# Patient Record
Sex: MALE
[De-identification: names, ages, dates, MRNs, and addresses within clinical notes are randomized per-mention and may not be internally consistent; named-entity substitution may affect disease eponyms.]

---

## 2018-01-05 ENCOUNTER — HOSPITAL ENCOUNTER (EMERGENCY)
Dept: HOSPITAL 14 - H.ER | Age: 38
LOS: 1 days | Discharge: LEFT BEFORE DISCHARGE | End: 2018-01-06
Payer: COMMERCIAL

## 2018-01-05 VITALS — TEMPERATURE: 98 F | OXYGEN SATURATION: 100 %

## 2018-01-05 VITALS — BODY MASS INDEX: 36.9 KG/M2

## 2018-01-05 DIAGNOSIS — F41.9: ICD-10-CM

## 2018-01-05 DIAGNOSIS — E78.00: ICD-10-CM

## 2018-01-05 DIAGNOSIS — E11.9: ICD-10-CM

## 2018-01-05 DIAGNOSIS — Z79.82: ICD-10-CM

## 2018-01-05 DIAGNOSIS — I10: ICD-10-CM

## 2018-01-05 DIAGNOSIS — R07.89: Primary | ICD-10-CM

## 2018-01-05 LAB
ALBUMIN SERPL-MCNC: 4.3 G/DL (ref 3.5–5)
ALBUMIN/GLOB SERPL: 1.5 {RATIO} (ref 1–2.1)
ALT SERPL-CCNC: 57 U/L (ref 21–72)
APTT BLD: 30.2 SECONDS (ref 25.6–37.1)
AST SERPL-CCNC: 25 U/L (ref 17–59)
BASOPHILS # BLD AUTO: 0.1 K/UL (ref 0–0.2)
BASOPHILS NFR BLD: 0.6 % (ref 0–2)
BUN SERPL-MCNC: 12 MG/DL (ref 9–20)
CALCIUM SERPL-MCNC: 9.3 MG/DL (ref 8.4–10.2)
EOSINOPHIL # BLD AUTO: 0.2 K/UL (ref 0–0.7)
EOSINOPHIL NFR BLD: 2.1 % (ref 0–4)
ERYTHROCYTE [DISTWIDTH] IN BLOOD BY AUTOMATED COUNT: 13.6 % (ref 11.5–14.5)
GFR NON-AFRICAN AMERICAN: > 60
HGB BLD-MCNC: 14.5 G/DL (ref 12–18)
INR PPP: 1.1 (ref 0.9–1.2)
LYMPHOCYTES # BLD AUTO: 2.2 K/UL (ref 1–4.3)
LYMPHOCYTES NFR BLD AUTO: 22.2 % (ref 20–40)
MCH RBC QN AUTO: 28.9 PG (ref 27–31)
MCHC RBC AUTO-ENTMCNC: 33.6 G/DL (ref 33–37)
MCV RBC AUTO: 86 FL (ref 80–94)
MONOCYTES # BLD: 0.8 K/UL (ref 0–0.8)
MONOCYTES NFR BLD: 7.9 % (ref 0–10)
NEUTROPHILS # BLD: 6.7 K/UL (ref 1.8–7)
NEUTROPHILS NFR BLD AUTO: 67.2 % (ref 50–75)
NRBC BLD AUTO-RTO: 0.2 % (ref 0–0)
PLATELET # BLD: 245 K/UL (ref 130–400)
PMV BLD AUTO: 8.6 FL (ref 7.2–11.7)
PROTHROMBIN TIME: 12 SECONDS (ref 9.8–13.1)
RBC # BLD AUTO: 5.02 MIL/UL (ref 4.4–5.9)
WBC # BLD AUTO: 9.9 K/UL (ref 4.8–10.8)

## 2018-01-05 PROCEDURE — 80053 COMPREHEN METABOLIC PANEL: CPT

## 2018-01-05 PROCEDURE — 93005 ELECTROCARDIOGRAM TRACING: CPT

## 2018-01-05 PROCEDURE — 85025 COMPLETE CBC W/AUTO DIFF WBC: CPT

## 2018-01-05 PROCEDURE — 85730 THROMBOPLASTIN TIME PARTIAL: CPT

## 2018-01-05 PROCEDURE — 99285 EMERGENCY DEPT VISIT HI MDM: CPT

## 2018-01-05 PROCEDURE — 84484 ASSAY OF TROPONIN QUANT: CPT

## 2018-01-05 PROCEDURE — 82948 REAGENT STRIP/BLOOD GLUCOSE: CPT

## 2018-01-05 PROCEDURE — 85610 PROTHROMBIN TIME: CPT

## 2018-01-05 NOTE — ED PDOC
HPI: Chest Pain


Time Seen by Provider: 01/05/18 22:23


Chief Complaint (Nursing): Chest Pain


Chief Complaint (Provider): Chest Pain


History Per: Patient


History/Exam Limitations: no limitations


Onset/Duration Of Symptoms: Hrs (x1 prior to arrival)


Current Symptoms Are (Timing): Still Present


Additional Complaint(s): 





37 year old male with previous medial history of hypertension, diabetes and 

severe anxiety, who presents to the emergency department with a complaint of 

left-sided chest pain radiating from left arm discomfort associated with left-

sided facial and neck tightness while sitting at home an hour prior to arrival 

after consuming dinner. Denied any nausea, vomiting or shortness of breath. 

Patient stated his pain was 10/10 at onset of symptoms but subsided to 6/10 

upon arrival to ED after taking 3 baby aspirin for relief. Of note, patient was 

recently evaluated by his cardiologist with a positive stress test and will be 

set up for a cardiac catheterization soon. 





Cardiologist: Jackelyn Lara MD





Past Medical History


Reviewed: Historical Data, Nursing Documentation, Vital Signs


Vital Signs: 


 Last Vital Signs











Temp  98.0 F   01/05/18 22:19


 


Pulse  68   01/05/18 23:03


 


Resp  16   01/05/18 22:19


 


BP  139/80   01/05/18 23:03


 


Pulse Ox  100   01/05/18 23:52














- Medical History


PMH: Anxiety, Diabetes, HTN, Hypercholesterolemia


   Denies: HIV, Chronic Kidney Disease





- Surgical History


Surgical History: No Surg Hx





- Family History


Family History: States: Unknown Family Hx, Diabetes, Hypertension





- Social History


Current smoker - smoking cessation education provided: No


Alcohol: Occasional


Drugs: Denies





- Immunization History


Hx Tetanus Toxoid Vaccination: No


Hx Influenza Vaccination: No


Hx Pneumococcal Vaccination: No





- Home Medications


Home Medications: 


 Ambulatory Orders











 Medication  Instructions  Recorded


 


ALPRAZolam [Xanax] 2 mg PO DAILY 09/26/17


 


Sitagliptin Phos/Metformin HCl 1 tab PO DAILY 09/26/17





[Janumet 50-1,000 mg Tablet]  


 


Valsartan/Hydrochlorothiazide 1 tab PO DAILY 09/26/17





[Valsartan-Hctz 320-25 mg Tab]  


 


Atorvastatin [Lipitor] 10 mg PO DAILY #30 tab 09/27/17


 


Aspirin [Ecotrin] 81 mg PO DAILY PRN 01/05/18


 


Metoprolol Succinate [Toprol Xl] 50 mg PO DAILY 01/05/18














- Allergies


Allergies/Adverse Reactions: 


 Allergies











Allergy/AdvReac Type Severity Reaction Status Date / Time


 


No Known Allergies Allergy   Verified 07/12/17 08:39














Review of Systems


ROS Statement: Except As Marked, All Systems Reviewed And Found Negative


Cardiovascular: Positive for: Chest Pain (left-sided tightness)


Respiratory: Negative for: Shortness of Breath


Gastrointestinal: Negative for: Nausea, Vomiting


Musculoskeletal: Positive for: Neck Pain (left-sided), Arm Pain (left-sided 

discomfort)


Neurological: Positive for: Other (left-sided facial tightness)


Psych: Positive for: Anxiety





Physical Exam





- Reviewed


Nursing Documentation Reviewed: Yes


Vital Signs Reviewed: Yes





- Physical Exam


Appears: Positive for: Well, Non-toxic, No Acute Distress


Skin: Positive for: Normal Color


Eye Exam: Positive for: Normal appearance


ENT: Positive for: Normal ENT Inspection


Neck: Positive for: Normal, Painless ROM, Supple.  Negative for: Decreased ROM


Cardiovascular/Chest: Positive for: Regular Rate, Rhythm.  Negative for: Chest 

Non Tender, Bradycardia, Tachycardia


Respiratory: Positive for: Normal Breath Sounds, Decreased Breath Sounds.  

Negative for: Wheezing, Respiratory Distress


Gastrointestinal/Abdominal: Positive for: Normal Exam, Soft.  Negative for: 

Tenderness


Extremity: Positive for: Normal ROM.  Negative for: Pedal Edema, Calf Tenderness


Neurologic/Psych: Positive for: Alert (x3), Oriented.  Negative for: Motor/

Sensory Deficits





- Laboratory Results


Result Diagrams: 


 01/05/18 23:00





 01/05/18 23:00





- ECG


O2 Sat by Pulse Oximetry: 100 (RA)


Pulse Ox Interpretation: Normal





Medical Decision Making


Medical Decision Making: 





Initial Impression:





Initial Plan:   


* EKG


* CMP


* Troponin I


* CBC


* PTT


* PT


* CXR


* Aspirin 81mg PO


* Lopressor 25mg PO


* NS 1,000ml IV per 125mls/hr


* Nitro-bid 2% oint 0.5


________________________________________________________________________________

_____





Time: 2312


--EKG: NSR at 68 BMP. No acute changes.





--------------------------------------------------------------------------------

------------------


Scribe Attestation:


Documented by Cece Khan, acting as a scribe for Chelsie Williamson MD.





Provider Scribe Attestation:


All medical record entries made by the Scribe were at my direction and 

personally dictated by me. I have reviewed the chart and agree that the record 

accurately reflects my personal performance of the history, physical exam, 

medical decision making, and the department course for this patient. I have 

also personally directed, reviewed, and agree with the discharge instructions 

and disposition.





11;45p - patient decides that he is feeling better and does not want admission. 

Reviewed the pertinet history with him and that he is at higher risk for acute 

coronary syndrome. This was explained to him in plain English. He understands 

the consequences and understands that he may return to our ER at any point and 

will receive the same care that he has thus far.





Disposition





- Clinical Impression


Clinical Impression: 


 Acute chest pain








- Disposition


Disposition: Against Medical Advice


Disposition Time: 00:00


Condition: GUARDED


Forms:  [facility] (English)





- POA


Present On Arrival: None

## 2018-01-06 VITALS — SYSTOLIC BLOOD PRESSURE: 138 MMHG | RESPIRATION RATE: 17 BRPM | DIASTOLIC BLOOD PRESSURE: 76 MMHG | HEART RATE: 81 BPM

## 2018-01-06 NOTE — CARD
--------------- APPROVED REPORT --------------





EKG Measurement

Heart [id number]

NJ [id number]

[id number]

[id number]

[id number]



<Conclusion>

Normal sinus rhythm

Minimal voltage criteria for LVH, may be normal variant

ST elevation, consider early repolarization, pericarditis, or injury

Abnormal ECG

## 2018-09-14 ENCOUNTER — HOSPITAL ENCOUNTER (OUTPATIENT)
Dept: HOSPITAL 14 - H.ER | Age: 38
Setting detail: OBSERVATION
LOS: 1 days | Discharge: HOME | End: 2018-09-15
Attending: INTERNAL MEDICINE | Admitting: INTERNAL MEDICINE
Payer: COMMERCIAL

## 2018-09-14 VITALS — BODY MASS INDEX: 36.9 KG/M2

## 2018-09-14 DIAGNOSIS — E66.01: ICD-10-CM

## 2018-09-14 DIAGNOSIS — R20.0: Primary | ICD-10-CM

## 2018-09-14 DIAGNOSIS — E11.65: ICD-10-CM

## 2018-09-14 DIAGNOSIS — R20.2: ICD-10-CM

## 2018-09-14 DIAGNOSIS — F41.9: ICD-10-CM

## 2018-09-14 DIAGNOSIS — E78.1: ICD-10-CM

## 2018-09-14 DIAGNOSIS — I10: ICD-10-CM

## 2018-09-14 DIAGNOSIS — E78.00: ICD-10-CM

## 2018-09-14 LAB
ALBUMIN SERPL-MCNC: 4.3 G/DL (ref 3.5–5)
ALBUMIN/GLOB SERPL: 1.4 {RATIO} (ref 1–2.1)
ALT SERPL-CCNC: 64 U/L (ref 21–72)
APTT BLD: 32.3 SECONDS (ref 25.6–37.1)
AST SERPL-CCNC: 35 U/L (ref 17–59)
BASOPHILS # BLD AUTO: 0.1 K/UL (ref 0–0.2)
BASOPHILS NFR BLD: 0.7 % (ref 0–2)
BUN SERPL-MCNC: 12 MG/DL (ref 9–20)
CALCIUM SERPL-MCNC: 9.4 MG/DL (ref 8.4–10.2)
EOSINOPHIL # BLD AUTO: 0.4 K/UL (ref 0–0.7)
EOSINOPHIL NFR BLD: 3.4 % (ref 0–4)
ERYTHROCYTE [DISTWIDTH] IN BLOOD BY AUTOMATED COUNT: 13.3 % (ref 11.5–14.5)
GFR NON-AFRICAN AMERICAN: > 60
HDLC SERPL-MCNC: 30 MG/DL (ref 30–70)
HGB BLD-MCNC: 15 G/DL (ref 12–18)
INR PPP: 1
LDLC SERPL-MCNC: 73 MG/DL (ref 0–129)
LYMPHOCYTES # BLD AUTO: 2.9 K/UL (ref 1–4.3)
LYMPHOCYTES NFR BLD AUTO: 25.6 % (ref 20–40)
MCH RBC QN AUTO: 29 PG (ref 27–31)
MCHC RBC AUTO-ENTMCNC: 33.5 G/DL (ref 33–37)
MCV RBC AUTO: 86.4 FL (ref 80–94)
MONOCYTES # BLD: 0.9 K/UL (ref 0–0.8)
MONOCYTES NFR BLD: 7.6 % (ref 0–10)
NEUTROPHILS # BLD: 7.1 K/UL (ref 1.8–7)
NEUTROPHILS NFR BLD AUTO: 62.7 % (ref 50–75)
NRBC BLD AUTO-RTO: 0.1 % (ref 0–0)
PLATELET # BLD: 282 K/UL (ref 130–400)
PMV BLD AUTO: 8.6 FL (ref 7.2–11.7)
PROTHROMBIN TIME: 11.6 SECONDS (ref 9.8–13.1)
RBC # BLD AUTO: 5.16 MIL/UL (ref 4.4–5.9)
WBC # BLD AUTO: 11.4 K/UL (ref 4.8–10.8)

## 2018-09-14 PROCEDURE — 70450 CT HEAD/BRAIN W/O DYE: CPT

## 2018-09-14 PROCEDURE — 84484 ASSAY OF TROPONIN QUANT: CPT

## 2018-09-14 PROCEDURE — 85025 COMPLETE CBC W/AUTO DIFF WBC: CPT

## 2018-09-14 PROCEDURE — 70498 CT ANGIOGRAPHY NECK: CPT

## 2018-09-14 PROCEDURE — 96361 HYDRATE IV INFUSION ADD-ON: CPT

## 2018-09-14 PROCEDURE — 85610 PROTHROMBIN TIME: CPT

## 2018-09-14 PROCEDURE — 70496 CT ANGIOGRAPHY HEAD: CPT

## 2018-09-14 PROCEDURE — 83036 HEMOGLOBIN GLYCOSYLATED A1C: CPT

## 2018-09-14 PROCEDURE — 84443 ASSAY THYROID STIM HORMONE: CPT

## 2018-09-14 PROCEDURE — 86900 BLOOD TYPING SEROLOGIC ABO: CPT

## 2018-09-14 PROCEDURE — 82948 REAGENT STRIP/BLOOD GLUCOSE: CPT

## 2018-09-14 PROCEDURE — 86850 RBC ANTIBODY SCREEN: CPT

## 2018-09-14 PROCEDURE — 85730 THROMBOPLASTIN TIME PARTIAL: CPT

## 2018-09-14 PROCEDURE — 82607 VITAMIN B-12: CPT

## 2018-09-14 PROCEDURE — 93005 ELECTROCARDIOGRAM TRACING: CPT

## 2018-09-14 PROCEDURE — 71045 X-RAY EXAM CHEST 1 VIEW: CPT

## 2018-09-14 PROCEDURE — 80061 LIPID PANEL: CPT

## 2018-09-14 PROCEDURE — 80053 COMPREHEN METABOLIC PANEL: CPT

## 2018-09-14 PROCEDURE — 36415 COLL VENOUS BLD VENIPUNCTURE: CPT

## 2018-09-14 PROCEDURE — 96360 HYDRATION IV INFUSION INIT: CPT

## 2018-09-14 PROCEDURE — 99284 EMERGENCY DEPT VISIT MOD MDM: CPT

## 2018-09-14 NOTE — ED PDOC
HPI:STROKE





- Time


Time: 23:09





- Historian


Historian: Patient,  (In Demand)





- Onset


Date: 18


Time: 18:30





- Timing


Timing: Improved





- Context


Context: Other





- Location


Locate left: Face





- Severity of pain


Maximum severity:: Mild





- TPA


Positive for Contraindication: No


Reason tPA is not being Administered: Rapidly improving symptoms





- Notes:


Notes:: 





Hx of DM, HTN, Anxiety presenting with L sided weakness, numbness, pain.  

States that around 630PM he was at work and became upset, started feeling 

dizzy.  States that he felt hungry and went to eat and half-way through his 

meal he felt worsening dizziness and the L side of his body became numb, 

concentrated mostly in the face and L upper extremity.  He went home and felt 

like he was unable to move his lips and had a "bad pain" in the center of his 

head.  He states the total duration of the weakness of the L arm was 1.5 hours.

  States when he was walking he felt total body weakness and felt like he was 

going to collapse.  States he also has had chest pain on and off for one week.  

On arrival to the ED he states his weakness and numbness have both improved but 

still having dizziness and pain in his left arm.  He states his face feels 

"shaky".





NIHSS Stroke Scale





- Date/Time Evaluation Performed


Date Performed: 18


Time Performed: 22:30


When Was NIHSS Performed: Baseline





- How Severe is the Stroke


Level of Consciousness: 0=Alert


LOC to Questions: 0=Both comments correct


LOC to commands: 0=Obeys both correctly


Best Gaze: 0=Normal


Visual: 0=No visual loss


Facial: 0=Normal


Motor Arm - Left: 0=No drift


Motor Arm - Right: 0=No drift


Motor Leg - Left: 0=No drift


Motor Leg - Right: 0=No drift


Limb Ataxia: 0=Absent


Sensory: 0=Normal


Best Language: 0=No aphasia


Dysarthia: 0=Normal articulation


Extinction & Inattention (Neglect): 0=Normal, no object


Score: 0


Severity Of Stroke: 0 = No Stroke





rTPA Inclusion/Exclusion





- Refusal of Treatment


Patient Refused Treatment: No





- Inclusion Criteria for Altepase


Patient is 18 years or Older: Yes


The Clinical Diagnosis of Ischemic Stroke That is Causing a Potentially 

Disabling Neurological Deficit: No


Time of Onset is Well Established to be Less Than 270 Minute Before Treatment 

Would Begin: Yes


Risk/Benefit Discussed With Patient/Family Member Present: Yes





Past Medical History


Reviewed: Historical Data, Nursing Documentation, Vital Signs


Vital Signs: 





 Last Vital Signs











Temp  98.3 F   18 22:19


 


Pulse  73   18 22:51


 


Resp  17   18 22:51


 


BP  126/73   18 22:51


 


Pulse Ox  96   18 22:19














- Medical History


PMH: Anxiety, Diabetes, HTN, Hypercholesterolemia


   Denies: HIV, Chronic Kidney Disease





- Family History


Family History: States: Unknown Family Hx, Diabetes, Hypertension





- Immunization History


Hx Tetanus Toxoid Vaccination: No


Hx Influenza Vaccination: No


Hx Pneumococcal Vaccination: No





- Home Medications


Home Medications: 


 Ambulatory Orders











 Medication  Instructions  Recorded


 


ALPRAZolam [Xanax] 2 mg PO DAILY 17


 


Sitagliptin Phos/Metformin HCl 1 tab PO DAILY 17





[Janumet 50-1,000 mg Tablet]  


 


Valsartan/Hydrochlorothiazide 1 tab PO DAILY 17





[Valsartan-Hctz 320-25 mg Tab]  


 


Atorvastatin [Lipitor] 10 mg PO DAILY #30 tab 17


 


Aspirin [Ecotrin] 81 mg PO DAILY PRN 18


 


Metoprolol Succinate [Toprol Xl] 50 mg PO DAILY 18














- Allergies


Allergies/Adverse Reactions: 


 Allergies











Allergy/AdvReac Type Severity Reaction Status Date / Time


 


No Known Allergies Allergy   Verified 18 22:19














Review of Systems


ROS Statement: Except As Marked, All Systems Reviewed And Found Negative


Cardiovascular: Positive for: Chest Pain


Musculoskeletal: Positive for: Arm Pain


Neurological: Positive for: Weakness, Numbness, Headache





Physical Exam





- Reviewed


Nursing Documentation Reviewed: Yes


Vital Signs Reviewed: Yes





- Physical Exam


Appears: Positive for: Well, Non-toxic, No Acute Distress


Head Exam: Positive for: ATRAUMATIC, NORMAL INSPECTION, NORMOCEPHALIC


Skin: Positive for: Normal Color, Warm, DRY


Eye Exam: Positive for: EOMI, Normal appearance, PERRL


ENT: Positive for: Normal ENT Inspection


Neck: Positive for: Normal, Painless ROM


Cardiovascular/Chest: Positive for: Regular Rate, Rhythm


Respiratory: Positive for: CNT, Normal Breath Sounds


Gastrointestinal/Abdominal: Positive for: Normal Exam, Soft.  Negative for: 

Tenderness


Back: Positive for: Normal Inspection


Extremity: Positive for: Normal ROM


Neurologic/Psych: Positive for: Alert, CNs II-XII, Oriented, Mood/Affect (Normal

), Cerebellar Tests, Gait (Normal).  Negative for: Motor/Sensory Deficits, 

Aphasia, Facial Droop





- Laboratory Results


Result Diagrams: 


 18 22:39





 18 22:39





- ECG


ECG Rhythm: Positive for: Normal QRS, Normal ST Segment, Sinus Rhythm


O2 Sat by Pulse Oximetry: 96


Pulse Ox Interpretation: Normal





- Radiology


X-Ray: Interpreted by Me


X-Ray Interpretation: No Acute Disease





- Core Measure


Core Measure Indicators: Code Stroke





- Critical Care


Total Time (In Min): 60


Documented Critical Care: Time excludes all time spent performint seperately 

billable procedures





Medical Decision Making


Medical Decision MakinPM


Hx of DM, HTN, Anxiety presenting with improved symptoms of weakness, numbness, 

pain


--Patient currently A&O x 3, GSC 15, NIH 0, nonfocal exam, well appearing, 

normal vitals


--ABC's intact, patient on monitor, labs drawn


--CT head with and without contrast completed: negative


--CXR: negative


--DDx: CVA, TIA, complex migraine





1023PM


--Spoke with Dr. Barger, agrees with management, most likely TIA given improving 

of symptoms





1030PM


--Case discussed with Dr. Duron who agrees with admission


--Patient remains well appearing, no acute changes








Disposition





- Clinical Impression


Clinical Impression: 


 TIA (transient ischemic attack), Weakness of one side of body








- Disposition


Disposition Time: 23:18


Condition: FAIR

## 2018-09-15 VITALS
DIASTOLIC BLOOD PRESSURE: 73 MMHG | HEART RATE: 75 BPM | OXYGEN SATURATION: 98 % | TEMPERATURE: 98.4 F | RESPIRATION RATE: 17 BRPM | SYSTOLIC BLOOD PRESSURE: 128 MMHG

## 2018-09-15 LAB
ALBUMIN SERPL-MCNC: 3.8 G/DL (ref 3.5–5)
ALBUMIN/GLOB SERPL: 1.3 {RATIO} (ref 1–2.1)
ALT SERPL-CCNC: 59 U/L (ref 21–72)
AST SERPL-CCNC: 31 U/L (ref 17–59)
BASOPHILS # BLD AUTO: 0.1 K/UL (ref 0–0.2)
BASOPHILS NFR BLD: 0.6 % (ref 0–2)
BUN SERPL-MCNC: 14 MG/DL (ref 9–20)
CALCIUM SERPL-MCNC: 9 MG/DL (ref 8.4–10.2)
EOSINOPHIL # BLD AUTO: 0.4 K/UL (ref 0–0.7)
EOSINOPHIL NFR BLD: 4.1 % (ref 0–4)
ERYTHROCYTE [DISTWIDTH] IN BLOOD BY AUTOMATED COUNT: 13.3 % (ref 11.5–14.5)
GFR NON-AFRICAN AMERICAN: > 60
HDLC SERPL-MCNC: 27 MG/DL (ref 30–70)
HGB BLD-MCNC: 14.4 G/DL (ref 12–18)
LDLC SERPL-MCNC: 67 MG/DL (ref 0–129)
LYMPHOCYTES # BLD AUTO: 3 K/UL (ref 1–4.3)
LYMPHOCYTES NFR BLD AUTO: 29.7 % (ref 20–40)
MCH RBC QN AUTO: 29.6 PG (ref 27–31)
MCHC RBC AUTO-ENTMCNC: 33.9 G/DL (ref 33–37)
MCV RBC AUTO: 87.3 FL (ref 80–94)
MONOCYTES # BLD: 0.9 K/UL (ref 0–0.8)
MONOCYTES NFR BLD: 9 % (ref 0–10)
NEUTROPHILS # BLD: 5.8 K/UL (ref 1.8–7)
NEUTROPHILS NFR BLD AUTO: 56.6 % (ref 50–75)
NRBC BLD AUTO-RTO: 0 % (ref 0–0)
PLATELET # BLD: 253 K/UL (ref 130–400)
PMV BLD AUTO: 9 FL (ref 7.2–11.7)
RBC # BLD AUTO: 4.88 MIL/UL (ref 4.4–5.9)
VIT B12 SERPL-MCNC: 773 PG/ML (ref 239–931)
WBC # BLD AUTO: 10.2 K/UL (ref 4.8–10.8)

## 2018-09-15 RX ADMIN — INSULIN LISPRO SCH: 100 INJECTION, SOLUTION INTRAVENOUS; SUBCUTANEOUS at 17:33

## 2018-09-15 RX ADMIN — INSULIN LISPRO SCH: 100 INJECTION, SOLUTION INTRAVENOUS; SUBCUTANEOUS at 09:31

## 2018-09-15 RX ADMIN — INSULIN LISPRO SCH UNIT: 100 INJECTION, SOLUTION INTRAVENOUS; SUBCUTANEOUS at 12:25

## 2018-09-15 NOTE — CT
Date of service: 



09/14/2018



PROCEDURE:  CT Angiography of the head and neck with contrast



HISTORY:

L sided weakness



COMPARISON:

Noncontrast CT scan head same day



TECHNIQUE:

Contiguous axial images of the neck were obtained from the level of 

the top of the skull to the superior mediastinum in the 

arteriographic phase of enhancement. Coronal and sagittal reformats 

were also generated. 3D MIP imaging was also performed. 



IV contrast dose: 100 milliliters



Radiation Dose - DLP: 520 mGy-cm 



This CT exam was performed using one or more of the following dose 

reduction techniques: Automated exposure control, adjustment of the 

mA and/or kV according to patient size, and/or use of iterative 

reconstruction technique.



FINDINGS:



RIGHT CAROTID ARTERIES:

Common Carotid Artery: Normal.



Carotid Bifurcation: Normal.



Internal Carotid Artery:Normal.



External Carotid Artery (proximal branches): Normal.



LEFT CAROTID ARTERIES:

Common Carotid Artery: Normal.



Carotid Bifurcation: Normal.



Internal Carotid Artery:Normal.



External Carotid Artery (proximal branches): Normal.



VERTEBRAL ARTERIES:

Right Vertebral Artery: Normal.



Left Vertebral Artery: Normal.



OTHER FINDINGS:

CT angiography of the brain reveals patent distal vertebral arteries 

and basilar artery. No major vessel occlusion is appreciated. No 

abnormal extrinsic compression upon the [place] vessels is 

noted.  No appreciable aneurysm is seen.



Visualized retro-orbital regions are unremarkable. No vascular 

anomaly is seen elsewhere. Visualized sinuses are clear.



No significant atherosclerotic narrowing of the carotid bifurcation 

and/or internal carotid artery's is seen.  There is less than 5 

percent luminal narrowing by NASCET criteria.



IMPRESSION:

Unremarkable CT angiography of the head and neck. No major artery 

vessel occlusion noted. This agrees with preliminary report provided 

by the on-call radiologist.

## 2018-09-15 NOTE — CP.PCM.CON
History of Present Illness





- History of Present Illness


History of Present Illness: 





Mr. Lau is a 38-year-old man with a past medical history of HTN, DM, who 

developed a constellation of neurological symptoms involving his left side of 

the face, arm and leg that were associated with pain and reyes-oral numbness/

pain after an episode of emotional agitation.  He presented to the ED.  CT scan 

of the head and CTA of the head/neck were normal. Labs showed hyperglycemia, 

but otherwise normal. Vital signs have been normal.  He is not completely back 

to normal and believes that he had gotten himself so upset and angry with his 

employees that he developed these symptoms.  





Review of Systems





- Review of Systems


All systems: reviewed and no additional remarkable complaints except





Past Patient History





- Past Medical History & Family History


Past Medical History?: Yes





- Past Social History


Smoking Status: Never Smoked





- CARDIAC


Hx Cardiac Disorders: Yes





- PULMONARY


Hx Respiratory Disorders: No





- NEUROLOGICAL


Hx Neurological Disorder: No





- HEENT


Hx HEENT Problems: No





- RENAL


Hx Chronic Kidney Disease: No





- ENDOCRINE/METABOLIC


Hx Endocrine Disorders: Yes





- HEMATOLOGICAL/ONCOLOGICAL


Hx AIDS: No


Hx Human Immunodeficiency Virus (HIV): No





- INTEGUMENTARY


Hx Dermatological Problems: No





- MUSCULOSKELETAL/RHEUMATOLOGICAL


Hx Musculoskeletal Disorders: No


Hx Falls: No





- GASTROINTESTINAL


Hx Gastrointestinal Disorders: No





- GENITOURINARY/GYNECOLOGICAL


Hx Genitourinary Disorders: No





- PSYCHIATRIC


Hx Psychophysiologic Disorder: Yes





- SURGICAL HISTORY


Hx Surgeries: No





- ANESTHESIA


Hx Anesthesia: No


Hx Anesthesia Reactions: No





Meds


Allergies/Adverse Reactions: 


 Allergies











Allergy/AdvReac Type Severity Reaction Status Date / Time


 


No Known Allergies Allergy   Verified 09/14/18 22:19














- Medications


Medications: 


 Current Medications





Alprazolam (Xanax)  1 mg PO TID [facility]


   Last Admin: 09/15/18 12:24 Dose:  1 mg


Aspirin (Ecotrin)  81 mg PO DAILY [facility]


   Last Admin: 09/15/18 09:24 Dose:  81 mg


Atorvastatin Calcium (Lipitor)  10 mg PO DAILY [facility]


   Last Admin: 09/15/18 09:24 Dose:  10 mg


Dextrose (Dextrose 50% Inj)  0 ml IV STAT PRN; Protocol


   PRN Reason: Hypoglycemia Protocol


Dextrose (Glutose 15)  0 gm PO ONCE PRN; Protocol


   PRN Reason: Hypoglycemia Protocol


Glucagon (Glucagen Diagnostic Kit)  0 mg IM STAT PRN; Protocol


   PRN Reason: Hypoglycemia Protocol


Sodium Chloride (Sodium Chloride 0.9%)  1,000 mls @ 100 mls/hr IV .Q10H [facility]


   Last Admin: 09/15/18 09:25 Dose:  100 mls/hr


Insulin Human Lispro (Humalog)  0 units SC QID [facility]


   PRN Reason: Protocol


   Last Admin: 09/15/18 12:25 Dose:  1 unit


Metoprolol Succinate (Toprol Xl)  50 mg PO DAILY [facility]


   Last Admin: 09/15/18 09:25 Dose:  50 mg











Physical Exam





- Neurological Exam


Neurological exam: Alert, CN II-XII Intact, Normal Gait, Oriented x3, Reflexes 

Normal


Additional comments: 





NIHSS is 0





Results





- Vital Signs


Recent Vital Signs: 


 Last Vital Signs











Temp  98.0 F   09/15/18 12:20


 


Pulse  74   09/15/18 12:20


 


Resp  18   09/15/18 12:20


 


BP  125/80   09/15/18 12:20


 


Pulse Ox  95   09/15/18 12:20














- Labs


Result Diagrams: 


 09/15/18 04:40





 09/15/18 04:40


Labs: 


 Laboratory Results - last 24 hr











  09/14/18 09/14/18 09/14/18





  22:39 22:39 22:39


 


WBC  11.4 H  


 


RBC  5.16  


 


Hgb  15.0  


 


Hct  44.6  


 


MCV  86.4  


 


MCH  29.0  


 


MCHC  33.5  


 


RDW  13.3  


 


Plt Count  282  


 


MPV  8.6  


 


Neut % (Auto)  62.7  


 


Lymph % (Auto)  25.6  


 


Mono % (Auto)  7.6  


 


Eos % (Auto)  3.4  


 


Baso % (Auto)  0.7  


 


Neut # (Auto)  7.1 H  


 


Lymph # (Auto)  2.9  


 


Mono # (Auto)  0.9 H  


 


Eos # (Auto)  0.4  


 


Baso # (Auto)  0.1  


 


PT    11.6


 


INR    1.0


 


APTT    32.3


 


Sodium   138 


 


Potassium   3.8 


 


Chloride   102 


 


Carbon Dioxide   27 


 


Anion Gap   13 


 


BUN   12 


 


Creatinine   1.1 


 


Est GFR ( Amer)   > 60 


 


Est GFR (Non-Af Amer)   > 60 


 


POC Glucose (mg/dL)   


 


Random Glucose   158 H 


 


Calcium   9.4 


 


Total Bilirubin   0.2 


 


AST   35 


 


ALT   64 


 


Alkaline Phosphatase   72 


 


Troponin I   < 0.0120 


 


Total Protein   7.4 


 


Albumin   4.3 


 


Globulin   3.1 


 


Albumin/Globulin Ratio   1.4 


 


Triglycerides   371 H D 


 


Cholesterol   147 


 


LDL Cholesterol Direct   73 


 


HDL Cholesterol   30 


 


Vitamin B12   


 


TSH 3rd Generation   


 


Blood Type   


 


Antibody Screen   


 


BBK History Checked   














  09/14/18 09/15/18 09/15/18





  22:39 04:40 04:40


 


WBC   10.2 


 


RBC   4.88 


 


Hgb   14.4 


 


Hct   42.6 


 


MCV   87.3 


 


MCH   29.6 


 


MCHC   33.9 


 


RDW   13.3 


 


Plt Count   253 


 


MPV   9.0 


 


Neut % (Auto)   56.6 


 


Lymph % (Auto)   29.7 


 


Mono % (Auto)   9.0 


 


Eos % (Auto)   4.1 H 


 


Baso % (Auto)   0.6 


 


Neut # (Auto)   5.8 


 


Lymph # (Auto)   3.0 


 


Mono # (Auto)   0.9 H 


 


Eos # (Auto)   0.4 


 


Baso # (Auto)   0.1 


 


PT   


 


INR   


 


APTT   


 


Sodium    139


 


Potassium    3.8


 


Chloride    104


 


Carbon Dioxide    30


 


Anion Gap    9 L


 


BUN    14


 


Creatinine    1.1


 


Est GFR ( Amer)    > 60


 


Est GFR (Non-Af Amer)    > 60


 


POC Glucose (mg/dL)   


 


Random Glucose    123 H


 


Calcium    9.0


 


Total Bilirubin    0.2


 


AST    31


 


ALT    59


 


Alkaline Phosphatase    69


 


Troponin I   


 


Total Protein    6.8


 


Albumin    3.8


 


Globulin    3.0


 


Albumin/Globulin Ratio    1.3


 


Triglycerides    253 H D


 


Cholesterol    130


 


LDL Cholesterol Direct    67


 


HDL Cholesterol    27 L


 


Vitamin B12   


 


TSH 3rd Generation   


 


Blood Type  O POSITIVE  


 


Antibody Screen  Negative  


 


BBK History Checked  Patient has bt  














  09/15/18 09/15/18 09/15/18





  06:48 09:30 09:43


 


WBC   


 


RBC   


 


Hgb   


 


Hct   


 


MCV   


 


MCH   


 


MCHC   


 


RDW   


 


Plt Count   


 


MPV   


 


Neut % (Auto)   


 


Lymph % (Auto)   


 


Mono % (Auto)   


 


Eos % (Auto)   


 


Baso % (Auto)   


 


Neut # (Auto)   


 


Lymph # (Auto)   


 


Mono # (Auto)   


 


Eos # (Auto)   


 


Baso # (Auto)   


 


PT   


 


INR   


 


APTT   


 


Sodium   


 


Potassium   


 


Chloride   


 


Carbon Dioxide   


 


Anion Gap   


 


BUN   


 


Creatinine   


 


Est GFR ( Amer)   


 


Est GFR (Non-Af Amer)   


 


POC Glucose (mg/dL)  107  106 


 


Random Glucose   


 


Calcium   


 


Total Bilirubin   


 


AST   


 


ALT   


 


Alkaline Phosphatase   


 


Troponin I   


 


Total Protein   


 


Albumin   


 


Globulin   


 


Albumin/Globulin Ratio   


 


Triglycerides   


 


Cholesterol   


 


LDL Cholesterol Direct   


 


HDL Cholesterol   


 


Vitamin B12    773


 


TSH 3rd Generation    3.54


 


Blood Type   


 


Antibody Screen   


 


BBK History Checked   














  09/15/18





  11:08


 


WBC 


 


RBC 


 


Hgb 


 


Hct 


 


MCV 


 


MCH 


 


MCHC 


 


RDW 


 


Plt Count 


 


MPV 


 


Neut % (Auto) 


 


Lymph % (Auto) 


 


Mono % (Auto) 


 


Eos % (Auto) 


 


Baso % (Auto) 


 


Neut # (Auto) 


 


Lymph # (Auto) 


 


Mono # (Auto) 


 


Eos # (Auto) 


 


Baso # (Auto) 


 


PT 


 


INR 


 


APTT 


 


Sodium 


 


Potassium 


 


Chloride 


 


Carbon Dioxide 


 


Anion Gap 


 


BUN 


 


Creatinine 


 


Est GFR ( Amer) 


 


Est GFR (Non-Af Amer) 


 


POC Glucose (mg/dL)  184 H


 


Random Glucose 


 


Calcium 


 


Total Bilirubin 


 


AST 


 


ALT 


 


Alkaline Phosphatase 


 


Troponin I 


 


Total Protein 


 


Albumin 


 


Globulin 


 


Albumin/Globulin Ratio 


 


Triglycerides 


 


Cholesterol 


 


LDL Cholesterol Direct 


 


HDL Cholesterol 


 


Vitamin B12 


 


TSH 3rd Generation 


 


Blood Type 


 


Antibody Screen 


 


BBK History Checked 














Assessment & Plan


(1) Numbness and tingling of left arm and leg


Assessment and Plan: 


The patient states that he was very angry and upset when this happened and it 

was more tingling and some numbness.  TIA is possible, but likely a stress 

response.  I recommend that the patient takes aspirin 81 mg daily and follows 

up with me as an outpatient to perform MRI and other follow up testing.  No 

further recommendations at this time since other tests have been normal.


Thank you. 


Status: Acute   Priority: High

## 2018-09-15 NOTE — CT
Date of service: 



09/14/2018



PROCEDURE:  CT HEAD WITHOUT CONTRAST.



HISTORY:

code stroke



COMPARISON:

None available.



TECHNIQUE:

Axial computed tomography images were obtained through the head/brain 

without intravenous contrast.  



Radiation dose:



Total exam DLP = 848 mGy-cm.



This CT exam was performed using one or more of the following dose 

reduction techniques: Automated exposure control, adjustment of the 

mA and/or kV according to patient size, and/or use of iterative 

reconstruction technique.



FINDINGS:



HEMORRHAGE:

No intracranial hemorrhage. 



BRAIN:

No mass effect or edema.  No atrophy or chronic microvascular 

ischemic changes.



VENTRICLES:

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 

ventricle being slightly larger than the right ventricle. No mass 

effect is seen. No hydrocephalus is noted.  Fourth ventricle is 

normal in size. 



CALVARIUM:

Unremarkable.



PARANASAL SINUSES:

Unremarkable as visualized. No significant inflammatory changes.



MASTOID AIR CELLS:

Unremarkable as visualized. No inflammatory changes.



OTHER FINDINGS:

None.



IMPRESSION:

Normal CT of the Head.

## 2018-09-15 NOTE — CARD
--------------- APPROVED REPORT --------------





Date of service: 09/14/2018



<Conclusion>

Normal sinus rhythm

Normal ECG

## 2018-09-15 NOTE — HP
CHIEF COMPLAINT:  Weakness and numbness on left side.



HISTORY OF PRESENT ILLNESS:  This is a 38-year-old male known cause of

diabetes, hypertension, anxiety, who had stressful day and was working at

home and started having left-sided weakness associated with pain and

symptoms did not improve, but finally they did improve after about 2 hours,

so the patient came to the emergency room and was admitted for further

management.



REVIEW OF SYSTEMS:  Positive for stressful status, upset at work,

left-sided weakness, and left-sided pain and shakiness of face.  Rest of

the review of system otherwise is negative for headache, dizziness,

syncope, loss of consciousness, chest pain, shortness of breath, nausea,

vomiting, diarrhea, constipation, any new joint or extremity pain.  Review

of systems of all other organ system is unremarkable.



PAST MEDICAL HISTORY:  Significant for diabetes, hypertension and anxiety.



PAST SURGICAL HISTORY:  Unremarkable.



PERSONAL HISTORY:  The patient is currently nonsmoker, nondrinker, no

substance abuse.



MEDICATIONS:  The patient is on multiple medications including Xanax,

metformin, valsartan, hydrochlorothiazide, Lipitor, aspirin and metoprolol.



ALLERGIES:  THE PATIENT IS NOT ALLERGIC TO ANY MEDICATIONS.



FAMILY HISTORY:  Noncontributory.



PHYSICAL EXAMINATION:

GENERAL:  A well-built, well-nourished overweight male in no acute

distress.

VITAL SIGNS:  Temperature 97.8, pulse 64, respirations 20, blood pressure

129/81.

HEENT:  Pupils reacting to light.  No JVD.  No thyromegaly.  No

lymphadenopathy.  No nystagmus.  Normocephalic and atraumatic scalp.

HEART:  S1 and S2.  Normal and regular.  No significant murmur, gallop or

rubs are heard.

LUNGS:  Showed good bilateral air exchange.  No rales or rhonchi.

ABDOMEN:  Soft and nontender.  No organomegaly.  No fluid.  Bowel sounds

are plus and normal.

EXTREMITIES:  No edema.  No calf swelling.  No tenderness.  No acute

ischemia.

CNS:  The patient is alert, awake and oriented x3.  There are no sign of

any acute gross focal, motor or sensory or neurological deficits.



DIAGNOSTIC DATA:  Available diagnostic data reviewed.  Telemetry monitoring

does not reveal significant arrhythmias.  WBC 11.4, hemoglobin 15,

hematocrit 44, platelets 282.  PT 11.6, PTT 32.3.  Sodium 138, potassium

3.8, chloride 102, bicarb 27, BUN 12, creatinine 1.1.  SMA-12 is

unremarkable except glucose of 158 and cholesterol is 147, but

triglycerides are 371.  Chest x-ray is unremarkable.  EKG shows normal

sinus rhythm without any acute ST-T changes.



ADMITTING IMPRESSION:  Transient ischemia attack, rule out cerebrovascular

accident, diabetes type 2 with hyperglycemia, hypertension, elevated

triglyceride, morbid obesity, body mass index of 35 and elevated

cholesterol.



PLAN:  As ordered.  Case and plan discussed with the patient.







__________________________________________

Song Duron MD





DD:  09/15/2018 10:41:55

DT:  09/15/2018 11:22:30

Job # [id number]

## 2018-09-15 NOTE — RAD
Date of service: 



09/14/2018



HISTORY:

Code Stroke  



COMPARISON:

09/25/2017 



FINDINGS:



LUNGS:

No active pulmonary disease.



PLEURA:

No significant pleural effusion identified, no pneumothorax apparent.



CARDIOVASCULAR:

Normal.



OSSEOUS STRUCTURES:

No significant abnormalities.



VISUALIZED UPPER ABDOMEN:

Normal.



OTHER FINDINGS:

None.



IMPRESSION:

No active disease.

## 2019-02-15 ENCOUNTER — HOSPITAL ENCOUNTER (EMERGENCY)
Dept: HOSPITAL 14 - H.ER | Age: 39
Discharge: HOME | End: 2019-02-15
Payer: COMMERCIAL

## 2019-02-15 VITALS — BODY MASS INDEX: 36.9 KG/M2

## 2019-02-15 DIAGNOSIS — E11.9: ICD-10-CM

## 2019-02-15 DIAGNOSIS — S39.012A: Primary | ICD-10-CM
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
## 2019-02-15 DIAGNOSIS — Z79.82: ICD-10-CM

## 2019-02-15 DIAGNOSIS — F41.9: ICD-10-CM
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
## 2019-02-15 DIAGNOSIS — I10: ICD-10-CM

## 2019-02-15 DIAGNOSIS — E78.00: ICD-10-CM

## 2019-02-15 LAB
ALBUMIN SERPL-MCNC: 4.3 G/DL (ref 3.5–5)
ALBUMIN/GLOB SERPL: 1.4 {RATIO} (ref 1–2.1)
ALT SERPL-CCNC: 134 U/L (ref 21–72)
AST SERPL-CCNC: 62 U/L (ref 17–59)
BACTERIA #/AREA URNS HPF: (no result) /[HPF]
BASOPHILS # BLD AUTO: 0.1 K/UL (ref 0–0.2)
BASOPHILS NFR BLD: 1.1 % (ref 0–2)
BILIRUB UR-MCNC: NEGATIVE MG/DL
BUN SERPL-MCNC: 15 MG/DL (ref 9–20)
CALCIUM SERPL-MCNC: 9.7 MG/DL (ref 8.4–10.2)
COLOR UR: YELLOW
EOSINOPHIL # BLD AUTO: 0.2 K/UL (ref 0–0.7)
EOSINOPHIL NFR BLD: 2.7 % (ref 0–4)
ERYTHROCYTE [DISTWIDTH] IN BLOOD BY AUTOMATED COUNT: 13.1 % (ref 11.5–14.5)
GFR NON-AFRICAN AMERICAN: > 60
GLUCOSE UR STRIP-MCNC: >=500 MG/DL
HGB BLD-MCNC: 15.1 G/DL (ref 12–18)
LEUKOCYTE ESTERASE UR-ACNC: (no result) LEU/UL
LYMPHOCYTES # BLD AUTO: 1.9 K/UL (ref 1–4.3)
LYMPHOCYTES NFR BLD AUTO: 23.6 % (ref 20–40)
MCH RBC QN AUTO: 28.9 PG (ref 27–31)
MCHC RBC AUTO-ENTMCNC: 33.5 G/DL (ref 33–37)
MCV RBC AUTO: 86 FL (ref 80–94)
MONOCYTES # BLD: 0.7 K/UL (ref 0–0.8)
MONOCYTES NFR BLD: 8.5 % (ref 0–10)
NEUTROPHILS # BLD: 5.3 K/UL (ref 1.8–7)
NEUTROPHILS NFR BLD AUTO: 64.1 % (ref 50–75)
NRBC BLD AUTO-RTO: 0.1 % (ref 0–0)
PH UR STRIP: 5 [PH] (ref 5–8)
PLATELET # BLD: 256 K/UL (ref 130–400)
PMV BLD AUTO: 9.2 FL (ref 7.2–11.7)
PROT UR STRIP-MCNC: NEGATIVE MG/DL
RBC # BLD AUTO: 5.22 MIL/UL (ref 4.4–5.9)
RBC # UR STRIP: NEGATIVE /UL
SP GR UR STRIP: 1.02 (ref 1–1.03)
URINE CLARITY: (no result)
UROBILINOGEN UR-MCNC: (no result) MG/DL (ref 0.2–1)
WBC # BLD AUTO: 8.2 K/UL (ref 4.8–10.8)

## 2019-02-15 NOTE — ED PDOC
HPI: Back


Time Seen by Provider: 02/15/19 18:34


Chief Complaint (Nursing): Back Pain


Chief Complaint (Provider): Back Pain


History Per: Patient


History/Exam Limitations: no limitations


Onset/Duration Of Symptoms: Days (x4)


Current Symptoms Are (Timing): Still Present


Additional Complaint(s): 


38 year old male with pmHx of diabetes, presents to ED with a complaint of rig

ht-sided flank pain ongoing for 4 days. He denies any urinary complaints, fever,

chills, or abdominal pain. Patient expresses concern for kidney pathology.





PCP: none provided





Past Medical History


Reviewed: Historical Data, Nursing Documentation, Vital Signs


Vital Signs: 


                                Last Vital Signs











Temp  98.2 F   02/15/19 18:11


 


Pulse  80   02/15/19 18:11


 


Resp  18   02/15/19 18:11


 


BP  134/61   02/15/19 18:11


 


Pulse Ox  99   02/15/19 18:11














- Medical History


PMH: Anxiety, Diabetes, HTN, Hypercholesterolemia


   Denies: HIV, Chronic Kidney Disease





- Family History


Family History: States: Unknown Family Hx, Diabetes, Hypertension





- Immunization History


Hx Tetanus Toxoid Vaccination: No


Hx Influenza Vaccination: No


Hx Pneumococcal Vaccination: No





- Home Medications


Home Medications: 


                                Ambulatory Orders











 Medication  Instructions  Recorded


 


ALPRAZolam [Xanax] 2 mg PO DAILY 09/26/17


 


Sitagliptin Phos/Metformin HCl 1 tab PO DAILY 09/26/17





[Janumet 50-1,000 mg Tablet]  


 


Valsartan/Hydrochlorothiazide 1 tab PO DAILY 09/26/17





[Valsartan-Hctz 320-25 mg Tab]  


 


Atorvastatin [Lipitor] 10 mg PO DAILY #30 tab 09/27/17


 


Aspirin [Ecotrin] 81 mg PO DAILY PRN 01/05/18


 


Metoprolol Succinate [Toprol Xl] 50 mg PO DAILY 01/05/18


 


Atorvastatin [Lipitor] 10 mg PO DAILY #0 tab 09/15/18


 


Metoprolol Succinate XL [Toprol XL] 50 mg PO DAILY  tab 09/15/18


 


Sitagliptin Phos/Metformin HCl 1 tab PO DAILY 09/15/18





[Janumet 50-1,000 mg Tablet]  


 


Cyclobenzaprine [Cyclobenzaprine 10 mg PO TID #10 tab 02/15/19





HCl]  














- Allergies


Allergies/Adverse Reactions: 


                                    Allergies











Allergy/AdvReac Type Severity Reaction Status Date / Time


 


No Known Allergies Allergy   Verified 02/15/19 18:11














Review of Systems


ROS Statement: Except As Marked, All Systems Reviewed And Found Negative


Constitutional: Negative for: Fever, Chills


Gastrointestinal: Negative for: Abdominal Pain


Genitourinary Male: Negative for: Dysuria, Frequency, Incontinence, Hematuria


Musculoskeletal: Positive for: Back Pain (right flank)





Physical Exam





- Reviewed


Nursing Documentation Reviewed: Yes


Vital Signs Reviewed: Yes





- Physical Exam


Appears: Positive for: No Acute Distress


Head Exam: Positive for: ATRAUMATIC, NORMAL INSPECTION, NORMOCEPHALIC


Skin: Positive for: Normal Color


Eye Exam: Positive for: Normal appearance


Neck: Positive for: Normal, Painless ROM


Cardiovascular/Chest: Positive for: Regular Rate, Rhythm


Respiratory: Positive for: Normal Breath Sounds.  Negative for: Respiratory 

Distress


Gastrointestinal/Abdominal: Positive for: Normal Exam, Soft.  Negative for: 

Tenderness


Back: Positive for: Vertebral Tenderness (mild left paralumbar).  Negative for: 

Other (midline or deformity)


Extremity: Positive for: Normal ROM (upper/lower)


Neurologic/Psych: Positive for: Alert, Oriented.  Negative for: Motor/Sensory 

Deficits





- Laboratory Results


Result Diagrams: 


                                 02/15/19 19:04





                                 02/15/19 19:04





- ECG


O2 Sat by Pulse Oximetry: 99 (RA)


Pulse Ox Interpretation: Normal





Medical Decision Making


Medical Decision Making: 


Time: 1840


Initial Plan: Urine and bloodwork obtained. Unlikely representative of renal 

pathology. Creatinine checked, as well. Most likely musculoskeletal pain.





-----

--------------------------------------------------------------------------------


------------


Scribe Attestation:


Documented by Cece Khan, acting as a scribe for Chris Julien MD.


Provider Scribe Attestation:


All medical record entries made by the Scribe were at my direction and 

personally dictated by me. I have reviewed the chart and agree that the record 

accurately reflects my personal performance of the history, physical exam, 

medical decision making, and the department course for this patient. I have also

personally directed, reviewed, and agree with the discharge instructions and 

disposition.





Disposition





- Clinical Impression


Clinical Impression: 


 Back strain








- Patient ED Disposition


Is Patient to be Admitted: No


Counseled Patient/Family Regarding: Studies Performed, Diagnosis, Need For 

Followup, Rx Given





- Disposition


Referrals: 


formerly [facility] [Outside]


Disposition: Routine/Home


Disposition Time: 20:47


Condition: FAIR


Prescriptions: 


Cyclobenzaprine [Cyclobenzaprine HCl] 10 mg PO TID #10 tab


Instructions:  Muscle Strain


Forms:  [facility] (English)


Print Language: Luxem[place]ish

## 2019-02-16 VITALS
TEMPERATURE: 98.1 F | SYSTOLIC BLOOD PRESSURE: 134 MMHG | DIASTOLIC BLOOD PRESSURE: 83 MMHG | HEART RATE: 75 BPM | OXYGEN SATURATION: 97 % | RESPIRATION RATE: 16 BRPM